# Patient Record
Sex: FEMALE | Race: WHITE | NOT HISPANIC OR LATINO | Employment: FULL TIME | URBAN - NONMETROPOLITAN AREA
[De-identification: names, ages, dates, MRNs, and addresses within clinical notes are randomized per-mention and may not be internally consistent; named-entity substitution may affect disease eponyms.]

---

## 2024-04-25 ENCOUNTER — OFFICE VISIT (OUTPATIENT)
Dept: SURGERY | Facility: CLINIC | Age: 64
End: 2024-04-25
Payer: COMMERCIAL

## 2024-04-25 VITALS
HEART RATE: 72 BPM | HEIGHT: 66 IN | WEIGHT: 162 LBS | OXYGEN SATURATION: 97 % | SYSTOLIC BLOOD PRESSURE: 119 MMHG | BODY MASS INDEX: 26.03 KG/M2 | DIASTOLIC BLOOD PRESSURE: 79 MMHG

## 2024-04-25 DIAGNOSIS — R14.0 ABDOMINAL BLOATING: ICD-10-CM

## 2024-04-25 DIAGNOSIS — K90.49 FOOD INTOLERANCE: ICD-10-CM

## 2024-04-25 DIAGNOSIS — R10.13 EPIGASTRIC PAIN: ICD-10-CM

## 2024-04-25 DIAGNOSIS — K82.4 GALLBLADDER POLYP: Primary | ICD-10-CM

## 2024-04-25 DIAGNOSIS — K29.50 CHRONIC GASTRITIS WITHOUT BLEEDING, UNSPECIFIED GASTRITIS TYPE: ICD-10-CM

## 2024-04-25 RX ORDER — IBUPROFEN 200 MG
200 TABLET ORAL EVERY 6 HOURS PRN
COMMUNITY
End: 2024-05-06

## 2024-04-25 RX ORDER — FAMOTIDINE 20 MG/1
20 TABLET, FILM COATED ORAL DAILY
Qty: 30 TABLET | Refills: 0 | Status: SHIPPED | OUTPATIENT
Start: 2024-04-25 | End: 2024-05-06

## 2024-04-25 RX ORDER — FAMOTIDINE 40 MG/1
40 TABLET, FILM COATED ORAL EVERY EVENING
Qty: 30 TABLET | Refills: 1 | Status: CANCELLED | OUTPATIENT
Start: 2024-04-25 | End: 2025-04-25

## 2024-04-25 RX ORDER — CHLORAL HYDRATE 500 MG
CAPSULE ORAL
COMMUNITY

## 2024-04-25 RX ORDER — DIPHENOXYLATE HYDROCHLORIDE AND ATROPINE SULFATE 2.5; .025 MG/1; MG/1
TABLET ORAL DAILY
COMMUNITY

## 2024-05-03 ENCOUNTER — TELEPHONE (OUTPATIENT)
Dept: NEUROSURGERY | Facility: CLINIC | Age: 64
End: 2024-05-03
Payer: COMMERCIAL

## 2024-05-03 NOTE — TELEPHONE ENCOUNTER
SPOKE TO PT TO CONFIRM APPT PT STATED SHE DOES NOT HAVE IMAGING DISC TOLD HER THAT SHE WILL NEED TO GET IT BEFORE MONDAY. IF NOT WE WILL NOT BE ABLE TO SEE HER TO HER SHE COULD COME IN AT 11. IF SHE HAS HER DISC.     It is okay for the hub to relay the message to the patient and schedule if possible

## 2024-05-06 ENCOUNTER — HOSPITAL ENCOUNTER (OUTPATIENT)
Dept: GENERAL RADIOLOGY | Facility: HOSPITAL | Age: 64
Discharge: HOME | End: 2024-05-06
Payer: COMMERCIAL

## 2024-05-06 ENCOUNTER — APPOINTMENT (OUTPATIENT)
Dept: OTHER | Facility: HOSPITAL | Age: 64
End: 2024-05-06
Payer: COMMERCIAL

## 2024-05-06 ENCOUNTER — OFFICE VISIT (OUTPATIENT)
Dept: NEUROSURGERY | Facility: CLINIC | Age: 64
End: 2024-05-06
Payer: COMMERCIAL

## 2024-05-06 VITALS — HEIGHT: 66 IN | BODY MASS INDEX: 26.16 KG/M2 | WEIGHT: 162.8 LBS

## 2024-05-06 DIAGNOSIS — M54.16 LUMBAR RADICULOPATHY: ICD-10-CM

## 2024-05-06 DIAGNOSIS — Z78.9 NONSMOKER: ICD-10-CM

## 2024-05-06 DIAGNOSIS — M54.2 CERVICALGIA: Primary | ICD-10-CM

## 2024-05-06 DIAGNOSIS — M43.16 SPONDYLOLISTHESIS OF LUMBAR REGION: ICD-10-CM

## 2024-05-06 DIAGNOSIS — E66.3 OVERWEIGHT WITH BODY MASS INDEX (BMI) OF 26 TO 26.9 IN ADULT: ICD-10-CM

## 2024-05-06 DIAGNOSIS — M48.061 SPINAL STENOSIS, LUMBAR REGION, WITHOUT NEUROGENIC CLAUDICATION: ICD-10-CM

## 2024-05-06 DIAGNOSIS — M54.2 CERVICALGIA: ICD-10-CM

## 2024-05-06 PROCEDURE — 72050 X-RAY EXAM NECK SPINE 4/5VWS: CPT

## 2024-05-06 PROCEDURE — 99204 OFFICE O/P NEW MOD 45 MIN: CPT | Performed by: PHYSICIAN ASSISTANT

## 2024-05-06 RX ORDER — METHOCARBAMOL 500 MG/1
TABLET, FILM COATED ORAL
Qty: 30 TABLET | Refills: 0 | Status: SHIPPED | OUTPATIENT
Start: 2024-05-06

## 2024-05-06 RX ORDER — ASPIRIN 81 MG/1
1 TABLET ORAL DAILY
COMMUNITY

## 2024-05-06 RX ORDER — IBUPROFEN 600 MG/1
TABLET ORAL
COMMUNITY
Start: 2024-01-08

## 2024-05-06 RX ORDER — FAMOTIDINE 40 MG/1
1 TABLET, FILM COATED ORAL DAILY
COMMUNITY

## 2024-05-06 RX ORDER — METHYLPREDNISOLONE 4 MG/1
TABLET ORAL
Qty: 21 TABLET | Refills: 1 | Status: SHIPPED | OUTPATIENT
Start: 2024-05-06

## 2024-05-06 RX ORDER — ALBUTEROL SULFATE 90 UG/1
AEROSOL, METERED RESPIRATORY (INHALATION)
COMMUNITY

## 2024-05-06 RX ORDER — FLUTICASONE PROPIONATE 50 MCG
SPRAY, SUSPENSION (ML) NASAL
COMMUNITY

## 2024-06-18 ENCOUNTER — OFFICE VISIT (OUTPATIENT)
Dept: GASTROENTEROLOGY | Facility: CLINIC | Age: 64
End: 2024-06-18
Payer: COMMERCIAL

## 2024-06-18 ENCOUNTER — TELEPHONE (OUTPATIENT)
Dept: GASTROENTEROLOGY | Facility: CLINIC | Age: 64
End: 2024-06-18

## 2024-06-18 VITALS
OXYGEN SATURATION: 97 % | WEIGHT: 160.4 LBS | DIASTOLIC BLOOD PRESSURE: 80 MMHG | HEART RATE: 68 BPM | TEMPERATURE: 95.3 F | HEIGHT: 66 IN | BODY MASS INDEX: 25.78 KG/M2 | SYSTOLIC BLOOD PRESSURE: 120 MMHG

## 2024-06-18 DIAGNOSIS — R10.13 EPIGASTRIC PAIN: Primary | ICD-10-CM

## 2024-06-18 DIAGNOSIS — I48.91 ATRIAL FIBRILLATION, UNSPECIFIED TYPE: ICD-10-CM

## 2024-06-18 DIAGNOSIS — Z78.9 NONSMOKER: ICD-10-CM

## 2024-06-18 DIAGNOSIS — I49.9 IRREGULAR HEARTBEAT: ICD-10-CM

## 2024-06-18 PROCEDURE — 99204 OFFICE O/P NEW MOD 45 MIN: CPT | Performed by: CLINICAL NURSE SPECIALIST

## 2024-06-18 RX ORDER — SENNOSIDES 8.6 MG
650 CAPSULE ORAL EVERY 8 HOURS PRN
COMMUNITY

## 2024-06-25 ENCOUNTER — HOSPITAL ENCOUNTER (OUTPATIENT)
Dept: GENERAL RADIOLOGY | Facility: HOSPITAL | Age: 64
Discharge: HOME | End: 2024-06-25
Admitting: CLINICAL NURSE SPECIALIST
Payer: COMMERCIAL

## 2024-06-25 DIAGNOSIS — R10.13 EPIGASTRIC PAIN: ICD-10-CM

## 2024-06-25 PROCEDURE — 74246 X-RAY XM UPR GI TRC 2CNTRST: CPT

## 2024-06-25 RX ADMIN — BARIUM SULFATE 120 ML: 980 POWDER, FOR SUSPENSION ORAL at 10:34

## 2024-06-25 RX ADMIN — ANTACID/ANTIFLATULENT 1 PACKET: 380; 550; 10; 10 GRANULE, EFFERVESCENT ORAL at 10:34

## 2024-07-02 ENCOUNTER — OFFICE VISIT (OUTPATIENT)
Dept: NEUROSURGERY | Facility: CLINIC | Age: 64
End: 2024-07-02
Payer: COMMERCIAL

## 2024-07-02 VITALS — HEIGHT: 66 IN | BODY MASS INDEX: 25.36 KG/M2 | WEIGHT: 157.8 LBS

## 2024-07-02 DIAGNOSIS — Z78.9 NONSMOKER: ICD-10-CM

## 2024-07-02 DIAGNOSIS — M54.12 CERVICAL RADICULOPATHY: ICD-10-CM

## 2024-07-02 DIAGNOSIS — E66.3 OVERWEIGHT WITH BODY MASS INDEX (BMI) OF 25 TO 25.9 IN ADULT: ICD-10-CM

## 2024-07-02 DIAGNOSIS — M50.30 DDD (DEGENERATIVE DISC DISEASE), CERVICAL: Primary | ICD-10-CM

## 2024-07-02 NOTE — PROGRESS NOTES
"    Chief complaint:   Chief Complaint   Patient presents with    Follow-up     Pt reports to office for follow up after PT- pt states doing good and doing home exercises.         Subjective     HPI: Breana comes in for recheck. She completed cervical physical therapy and reports her symptoms are improved although not resolved.  She continues with left-sided neck pain with radiation into the parascapular region.  Pain increases with activity and certain cervical positions, particularly when her neck is in flexion.  She denies any radiating pain to the upper extremities as well as focal weakness and paresthesias.  Last visit, I gave her steroids which caused her blood pressure to elevate and gave her palpitations.  She is currently utilizing ibuprofen as needed.  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  She previously had been doing this in the office in a standing position and felt that it eased her neck symptoms.    Her lumbar issues are stable.  She denies neurogenic claudication symptoms.  She does not have any radiating pain to the lower extremities and denies focal weakness and paresthesias, specifically any foot drop.    Review of Systems      Objective      Vital Signs  Ht 167.6 cm (65.98\")   Wt 71.6 kg (157 lb 12.8 oz)   BMI 25.48 kg/m²     Physical Exam  Constitutional:       Appearance: Normal appearance. She is well-developed.   HENT:      Head: Normocephalic.   Eyes:      Pupils: Pupils are equal, round, and reactive to light.   Cardiovascular:      Rate and Rhythm: Normal rate.   Pulmonary:      Effort: Pulmonary effort is normal.   Musculoskeletal:         General: Normal range of motion.      Cervical back: Tenderness present.   Skin:     General: Skin is warm and dry.   Neurological:      Mental Status: She is alert and oriented to person, place, and time.      GCS: GCS eye subscore is 4. GCS verbal subscore " is 5. GCS motor subscore is 6.      Cranial Nerves: No cranial nerve deficit.      Sensory: No sensory deficit.      Motor: Motor strength is normal.No weakness.      Coordination: Coordination normal.      Gait: Gait is intact. Gait normal.      Deep Tendon Reflexes: Reflexes are normal and symmetric. Reflexes normal.      Reflex Scores:       Tricep reflexes are 2+ on the right side and 2+ on the left side.       Bicep reflexes are 2+ on the right side and 2+ on the left side.       Brachioradialis reflexes are 2+ on the right side and 2+ on the left side.       Patellar reflexes are 2+ on the right side and 2+ on the left side.       Achilles reflexes are 2+ on the right side and 2+ on the left side.  Psychiatric:         Speech: Speech normal.         Behavior: Behavior normal.         Thought Content: Thought content normal.         Neurologic Exam     Mental Status   Oriented to person, place, and time.   Speech: speech is normal   Level of consciousness: alert  Knowledge: good.     Cranial Nerves     CN III, IV, VI   Pupils are equal, round, and reactive to light.    Motor Exam   Muscle bulk: normal  Overall muscle tone: normal    Strength   Strength 5/5 throughout.     Sensory Exam   Light touch normal.     Gait, Coordination, and Reflexes     Gait  Gait: normal    Reflexes   Right brachioradialis: 2+  Left brachioradialis: 2+  Right biceps: 2+  Left biceps: 2+  Right triceps: 2+  Left triceps: 2+  Right patellar: 2+  Left patellar: 2+  Right achilles: 2+  Left achilles: 2+  Right : 2+  Left : 2+  Right Demarco: absent  Left Demarco: absent      Imaging review: Previous cervical films were reviewed and demonstrate subtle anterior listhesis C3 over 4.  There is severe disc degeneration C5-6 and C6-7.    MRI report of the cervical spine from 2019 was reviewed and describes moderate central stenosis C5-6 with severe bilateral neuroforaminal stenosis.  He also mentions cord syrinx located at the C6  level.        Assessment/Plan: I reviewed images in detail with Breana.  She has severe disc degeneration C5-7 and subtle anterior listhesis C3-4.  She still has some left-sided neck pain with radiation into the left shoulder blade.  She is neurologically stable.  Unfortunately, physical therapy failed to provide symptom resolution.  I therefore would like to proceed with an MRI of the cervical spine to see if there is any significant stenosis that needs addressed surgically.    Currently, her lumbar symptoms are stable.  She does not have any neurogenic claudication symptoms or radicular pain.  We discussed symptoms to report promptly like weakness, specifically foot drop.  She verbalizes understanding.    Recommend she continue her home exercises as current.  We discussed avoidance of aggravating activities as well as protective body mechanics.    She will follow-up after the cervical MRI is completed with Dr. Montalvo.  She will call for sooner appointment if she has any worsening pain, focal weakness or other issues or concerns.    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    Patient is a nonsmoker    The patient's Body mass index is 25.48 kg/m².. BMI is above normal parameters. Recommendations include: educational material    Diagnoses and all orders for this visit:    1. DDD (degenerative disc disease), cervical (Primary)  -     MRI Cervical Spine Without Contrast; Future    2. Cervical radiculopathy  -     MRI Cervical Spine Without Contrast; Future    3. Nonsmoker    4. Overweight with body mass index (BMI) of 25 to 25.9 in adult    I spent 38 minutes caring for Breana on this date of service. This time includes time spent by me in the following activities: preparing for the visit, reviewing tests, obtaining and/or reviewing a separately obtained history, performing a medically appropriate examination and/or evaluation, counseling  and educating the patient/family/caregiver, referring and communicating with other health care professionals, documenting information in the medical record, independently interpreting results and communicating that information with the patient/family/caregiver, and care coordination.      I discussed the patients findings and my recommendations with patient  Nasima Neal PA-C  07/02/24  11:48 CDT

## 2024-07-02 NOTE — LETTER
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   Thank you for your accomodation in this matter.        Sincerely,        Nasima Neal PA-C    CC:   No Recipients

## 2024-07-11 DIAGNOSIS — R10.13 EPIGASTRIC PAIN: Primary | ICD-10-CM

## 2024-07-11 RX ORDER — FAMOTIDINE 40 MG/1
40 TABLET, FILM COATED ORAL DAILY
Qty: 30 TABLET | Refills: 6 | Status: SHIPPED | OUTPATIENT
Start: 2024-07-11

## 2024-07-11 NOTE — TELEPHONE ENCOUNTER
Rx Refill Note  Requested Prescriptions      No prescriptions requested or ordered in this encounter      Last office visit with prescribing clinician: 6/18/2024     Last telemedicine visit with prescribing clinician: Visit date not found     Next office visit with prescribing clinician: 9/4/2024 for endo with Dr. Brizuela.     Pt was just seen in office. She called me just now stating she needs refills on her Famotidine 40mg daily. I have pended refills to [facility]. Pt advised to call me back with any further questions/problems.                          Would you like a call back once the refill request has been completed: [] Yes [] No    If the office needs to give you a call back, can they leave a voicemail: [] Yes [] No    Zoe Baez MA  07/11/24, 13:56 CDT

## 2024-07-31 ENCOUNTER — HOSPITAL ENCOUNTER (OUTPATIENT)
Dept: MRI IMAGING | Facility: HOSPITAL | Age: 64
Discharge: HOME | End: 2024-07-31
Admitting: PHYSICIAN ASSISTANT
Payer: COMMERCIAL

## 2024-07-31 DIAGNOSIS — M50.30 DDD (DEGENERATIVE DISC DISEASE), CERVICAL: ICD-10-CM

## 2024-07-31 DIAGNOSIS — M54.12 CERVICAL RADICULOPATHY: ICD-10-CM

## 2024-07-31 PROCEDURE — 72141 MRI NECK SPINE W/O DYE: CPT

## 2024-09-04 ENCOUNTER — ANESTHESIA EVENT (OUTPATIENT)
Dept: GASTROENTEROLOGY | Facility: HOSPITAL | Age: 64
End: 2024-09-04
Payer: COMMERCIAL

## 2024-09-04 ENCOUNTER — HOSPITAL ENCOUNTER (OUTPATIENT)
Facility: HOSPITAL | Age: 64
Setting detail: HOSPITAL OUTPATIENT SURGERY
Discharge: HOME | End: 2024-09-04
Attending: INTERNAL MEDICINE | Admitting: INTERNAL MEDICINE
Payer: COMMERCIAL

## 2024-09-04 ENCOUNTER — ANESTHESIA (OUTPATIENT)
Dept: GASTROENTEROLOGY | Facility: HOSPITAL | Age: 64
End: 2024-09-04
Payer: COMMERCIAL

## 2024-09-04 VITALS
BODY MASS INDEX: 23.63 KG/M2 | WEIGHT: 147 LBS | SYSTOLIC BLOOD PRESSURE: 127 MMHG | RESPIRATION RATE: 9 BRPM | OXYGEN SATURATION: 100 % | HEART RATE: 70 BPM | DIASTOLIC BLOOD PRESSURE: 79 MMHG | TEMPERATURE: 96.8 F | HEIGHT: 66 IN

## 2024-09-04 DIAGNOSIS — R10.13 EPIGASTRIC PAIN: ICD-10-CM

## 2024-09-04 PROBLEM — K29.70 HELICOBACTER PYLORI GASTRITIS: Status: ACTIVE | Noted: 2024-09-04

## 2024-09-04 PROBLEM — B96.81 HELICOBACTER PYLORI GASTRITIS: Status: ACTIVE | Noted: 2024-09-04

## 2024-09-04 PROCEDURE — 25010000002 PROPOFOL 10 MG/ML EMULSION: Performed by: NURSE ANESTHETIST, CERTIFIED REGISTERED

## 2024-09-04 PROCEDURE — 43239 EGD BIOPSY SINGLE/MULTIPLE: CPT | Performed by: INTERNAL MEDICINE

## 2024-09-04 PROCEDURE — 87081 CULTURE SCREEN ONLY: CPT | Performed by: INTERNAL MEDICINE

## 2024-09-04 PROCEDURE — 25810000003 SODIUM CHLORIDE 0.9 % SOLUTION: Performed by: NURSE ANESTHETIST, CERTIFIED REGISTERED

## 2024-09-04 PROCEDURE — 88305 TISSUE EXAM BY PATHOLOGIST: CPT | Performed by: INTERNAL MEDICINE

## 2024-09-04 RX ORDER — SODIUM CHLORIDE 9 MG/ML
500 INJECTION, SOLUTION INTRAVENOUS CONTINUOUS PRN
Status: DISCONTINUED | OUTPATIENT
Start: 2024-09-04 | End: 2024-09-04 | Stop reason: HOSPADM

## 2024-09-04 RX ORDER — LIDOCAINE HYDROCHLORIDE 10 MG/ML
0.5 INJECTION, SOLUTION EPIDURAL; INFILTRATION; INTRACAUDAL; PERINEURAL ONCE AS NEEDED
Status: DISCONTINUED | OUTPATIENT
Start: 2024-09-04 | End: 2024-09-04 | Stop reason: HOSPADM

## 2024-09-04 RX ORDER — PROPOFOL 10 MG/ML
VIAL (ML) INTRAVENOUS AS NEEDED
Status: DISCONTINUED | OUTPATIENT
Start: 2024-09-04 | End: 2024-09-04 | Stop reason: SURG

## 2024-09-04 RX ORDER — PANTOPRAZOLE SODIUM 40 MG/1
40 TABLET, DELAYED RELEASE ORAL DAILY
Qty: 30 TABLET | Refills: 11 | Status: SHIPPED | OUTPATIENT
Start: 2024-09-04

## 2024-09-04 RX ORDER — LIDOCAINE HYDROCHLORIDE 20 MG/ML
INJECTION, SOLUTION EPIDURAL; INFILTRATION; INTRACAUDAL; PERINEURAL AS NEEDED
Status: DISCONTINUED | OUTPATIENT
Start: 2024-09-04 | End: 2024-09-04 | Stop reason: SURG

## 2024-09-04 RX ORDER — SODIUM CHLORIDE 0.9 % (FLUSH) 0.9 %
10 SYRINGE (ML) INJECTION AS NEEDED
Status: DISCONTINUED | OUTPATIENT
Start: 2024-09-04 | End: 2024-09-04 | Stop reason: HOSPADM

## 2024-09-04 RX ADMIN — SODIUM CHLORIDE 500 ML: 9 INJECTION, SOLUTION INTRAVENOUS at 07:23

## 2024-09-04 RX ADMIN — LIDOCAINE HYDROCHLORIDE 150 MG: 20 INJECTION, SOLUTION EPIDURAL; INFILTRATION; INTRACAUDAL; PERINEURAL at 07:39

## 2024-09-04 RX ADMIN — PROPOFOL 100 MG: 10 INJECTION, EMULSION INTRAVENOUS at 07:39

## 2024-09-04 NOTE — H&P
Chief Complaint:   Midepigastric pain    Subjective     HPI:   Patient is having complaints of midepigastric pain associated with bloating.  She had an endoscopy at outside hospital in [place] and was told that she needs intermittent endoscopies.  Results not available for review.    Past Medical History:   Past Medical History:   Diagnosis Date    A-fib     Arthritis     Diverticulosis     Heart trouble     Herniated disc, cervical     Herniated intervertebral disc of lumbar spine     History of colon polyps     Kidney disorder     3rd kidney on left side. works seperately    PONV (postoperative nausea and vomiting)        Past Surgical History:  Past Surgical History:   Procedure Laterality Date     SECTION      x3    COLONOSCOPY  2020    Dr. Rojas-Grade 2 internal hemorrhoids; Diverticulosis; One 3mm hyperplastic polyp in distal sigmoid; One 4mm polyp in distal transverse-path shows chronic nonspecific colitis; One 4mm hyperplastic polyp in proximal transverse; One 4mm polyp in ascending-path shows chronic colitis with reactive lymphoid aggregate; Erythema and occasional erosions in terminal ileum-biopsied; Repeat 4-5 years    COLONOSCOPY  2015    Dr. Rojas-One 3mm hyperplastic polyp in the rectum; Patchy erythamatous patches in rectum-questionable enema effects-biopsied    COLONOSCOPY  2014    Dr. Rojas-Grade 2 internal hemorrhoids; One 5mm polyp in the mid-transverse colon-path shows colonic mucosa with focal glandular hyperplasia; One 6mm polyp in the cecum-path shows colonic mucosa with focal glandular hyperplasia; Friability and erythema in the rectum; Friability and granularity in the terminal ileum; Normal mucosa in the ascending and descending colon-biopsied; Repeat 5 years    ENDOSCOPY  10/28/2011    duodenitis, mild intraepithelial lymphocytosis, mod/sev chronic gastritis with crushed myphoid aggregates, H pylori, GERD    TRIGGER FINGER RELEASE      x2 thumb     "TUBAL ABDOMINAL LIGATION         Family History:  Family History   Problem Relation Age of Onset    Heart disease Mother     Hypertension Mother     Diabetes Mother     Cancer Father     Diabetes Father     Diabetes Sister     Diabetes Brother     Colon cancer Neg Hx     Colon polyps Neg Hx        Social History:   reports that she has never smoked. She has never been exposed to tobacco smoke. She has never used smokeless tobacco. She reports that she does not drink alcohol and does not use drugs.    Medications:   Medications Prior to Admission   Medication Sig Dispense Refill Last Dose    acetaminophen (Tylenol 8 Hour) 650 MG 8 hr tablet Take 1 tablet by mouth Every 8 (Eight) Hours As Needed.   Past Month    famotidine (PEPCID) 40 MG tablet Take 1 tablet by mouth Daily. 30 tablet 6 9/3/2024    Digestive Enzymes (SUPER PAPAYA PLUS PO) Take  by mouth.   More than a month    multivitamin (THERAGRAN) tablet tablet Take  by mouth Daily.   9/2/2024    NATTOKINASE PO Take 1 capsule by mouth twice a day as directed   9/1/2024    Omega-3 Fatty Acids (Omega-3 Fish Oil) 1000 MG capsule Take  by mouth.   9/2/2024       Allergies:  Morphine    ROS:    Resp: No SOA  Cardiovascular: No CP      Objective     /71 (BP Location: Right arm, Patient Position: Sitting)   Pulse 58   Temp 96.8 °F (36 °C) (Temporal)   Resp 18   Ht 167.6 cm (66\")   Wt 66.7 kg (147 lb)   SpO2 99%   BMI 23.73 kg/m²     Physical Exam   Constitutional: Pt is oriented to person, place, and in no distress.   Pulmonary/Chest: No distress.  No audible wheezes  Psychiatric: Mood, memory, affect and judgment appear normal.     Assessment & Plan     Diagnosis:  Midepigastric pain  Bloating    Anticipated Surgical Procedure:  Endoscopy    The risks, benefits, and alternatives of endoscopy were reviewed with the patient today.  Risks including perforation, with or without dilation, possibly requiring surgery.  Additional risks include risk of bleeding.  " There is also the risk of a drug reaction or problems with anesthesia.  This will be discussed with the further by the anesthesia team on the day of the procedure. The benefits include the diagnosis and management of disease of the upper digestive tract.  Alternatives to endoscopy include upper GI series, laboratory testing, radiographic evaluation, or no intervention.  The patient verbalizes understanding and agrees.    Please note that portions of this note were completed with a voice recognition program.

## 2024-09-04 NOTE — ANESTHESIA POSTPROCEDURE EVALUATION
"Patient: Breana Poon    Procedure Summary       Date: 09/04/24 Room / Location: [facility] ENDOSCOPY 5 / [facility] END[facility]    Anesthesia Start: 0735 Anesthesia Stop: 0750    Procedure: ESOPHAGOGASTRODUODENOSCOPY WITH ANESTHESIA Diagnosis:       Epigastric pain      (Epigastric pain [R10.13])    Surgeons: Angela Brizuela MD Provider: Celio Dorsey CRNA    Anesthesia Type: MAC ASA Status: 2            Anesthesia Type: MAC    Vitals  Vitals Value Taken Time   /76 09/04/24 0748   Temp     Pulse 68 09/04/24 0750   Resp 15 09/04/24 0748   SpO2 96 % 09/04/24 0750   Vitals shown include unfiled device data.        Post Anesthesia Care and Evaluation    Patient location during evaluation: PACU  Patient participation: complete - patient participated  Level of consciousness: awake and alert  Pain management: adequate    Airway patency: patent  Anesthetic complications: No anesthetic complications    Cardiovascular status: acceptable  Respiratory status: acceptable  Hydration status: acceptable    Comments: Blood pressure 122/76, pulse 67, temperature 96.8 °F (36 °C), temperature source Temporal, resp. rate 15, height 167.6 cm (66\"), weight 66.7 kg (147 lb), SpO2 96%, not currently breastfeeding.    Pt discharged from PACU based on zelalem score >8    "

## 2024-09-04 NOTE — ANESTHESIA PREPROCEDURE EVALUATION
Anesthesia Evaluation     history of anesthetic complications:  PONV  NPO Solid Status: > 8 hours  NPO Liquid Status: > 8 hours           Airway   Mallampati: I  No difficulty expected  Dental      Pulmonary    (-) sleep apnea, not a smoker  Cardiovascular   Exercise tolerance: good (4-7 METS)    (+) dysrhythmias Atrial Fib  (-) pacemaker, CAD      Neuro/Psych  (-) seizures, TIA, CVA  GI/Hepatic/Renal/Endo    (+) GERD, renal disease (accessory kidney)-, diabetes mellitus (borderline)  (-) liver disease    Musculoskeletal     Abdominal    Substance History      OB/GYN          Other   arthritis,                   Anesthesia Plan    ASA 2     MAC     intravenous induction     Anesthetic plan, risks, benefits, and alternatives have been provided, discussed and informed consent has been obtained with: patient.    CODE STATUS:

## 2024-09-05 LAB
CYTO UR: NORMAL
LAB AP CASE REPORT: NORMAL
Lab: NORMAL
PATH REPORT.FINAL DX SPEC: NORMAL
PATH REPORT.GROSS SPEC: NORMAL
UREASE TISS QL: NEGATIVE

## 2024-09-16 ENCOUNTER — OFFICE VISIT (OUTPATIENT)
Dept: NEUROSURGERY | Facility: CLINIC | Age: 64
End: 2024-09-16
Payer: COMMERCIAL

## 2024-09-16 VITALS — BODY MASS INDEX: 24.75 KG/M2 | WEIGHT: 154 LBS | HEIGHT: 66 IN

## 2024-09-16 DIAGNOSIS — M54.2 CERVICALGIA: ICD-10-CM

## 2024-09-16 DIAGNOSIS — M54.12 CERVICAL RADICULOPATHY: ICD-10-CM

## 2024-09-16 DIAGNOSIS — M50.30 DDD (DEGENERATIVE DISC DISEASE), CERVICAL: Primary | ICD-10-CM

## 2024-09-16 DIAGNOSIS — Z78.9 NONSMOKER: ICD-10-CM

## 2024-09-16 PROCEDURE — 99213 OFFICE O/P EST LOW 20 MIN: CPT | Performed by: NEUROLOGICAL SURGERY

## 2025-03-03 ENCOUNTER — TELEPHONE (OUTPATIENT)
Dept: NEUROSURGERY | Facility: CLINIC | Age: 65
End: 2025-03-03
Payer: COMMERCIAL

## 2025-04-21 PROBLEM — Z86.0100 HX OF COLONIC POLYPS: Status: ACTIVE | Noted: 2025-04-21

## 2025-04-21 NOTE — PROGRESS NOTES
"Primary Physician: Jodie Malik APRN    Chief Complaint   Patient presents with    Colon Cancer Screening     Pt presents today for colon recall-last colon was 3/2/2020 by Dr. Rojas; Personal history of hyperplastic polyps       Subjective     Breana Poon is a 65 y.o. female.    HPI  Hx Colon Polyps  3/3/2020 Colonoscopy in [facility]: Sigmoid hyperplastic polyp, diverticulosis.  Hyperplastic polyp removed in .    No constipation, abdominal pain or rectal bleeding.  Pt reports she has been having watery stools with urgency that can occur in early morning or mid day while at work.  She will have diarrhea about 4 times per day. No blood in her stools.    No family hx colon cancer.    GERD  She has belching at times. Denies lactose intake.  She has mid epigastric pain.  2024 Endoscopy: small HH, widely patent schatzki ring at [facility] [place].  She is taking Protonix daily.  At times she feels like liquids may go down windpipe instead of esophagus. I did discuss with her setting up an esophagram to evaluate her swallowing and esophagus muscles but she declines that for now as it is \"very Rare\" when it does happen.  She denies any caffeine.  She feels like her stomach triggers her heart to go into AFib.      Past Medical History:   Diagnosis Date    A-fib     Arthritis     Diverticulosis     Heart trouble     Herniated disc, cervical     Herniated intervertebral disc of lumbar spine     History of colon polyps     Kidney disorder     3rd kidney on left side. works seperately    PONV (postoperative nausea and vomiting)        Past Surgical History:   Procedure Laterality Date     SECTION      x3    COLONOSCOPY  2020    Dr. Rojas-Grade 2 internal hemorrhoids; Diverticulosis; One 3mm hyperplastic polyp in distal sigmoid; One 4mm polyp in distal transverse-path shows chronic nonspecific colitis; One 4mm hyperplastic polyp in proximal transverse; One 4mm polyp in ascending-path shows chronic " colitis with reactive lymphoid aggregate; Erythema and occasional erosions in terminal ileum-biopsied; Repeat 4-5 years    COLONOSCOPY  09/28/2015    Dr. Rojas-One 3mm hyperplastic polyp in the rectum; Patchy erythamatous patches in rectum-questionable enema effects-biopsied    COLONOSCOPY  02/21/2014    Dr. Rojas-Grade 2 internal hemorrhoids; One 5mm polyp in the mid-transverse colon-path shows colonic mucosa with focal glandular hyperplasia; One 6mm polyp in the cecum-path shows colonic mucosa with focal glandular hyperplasia; Friability and erythema in the rectum; Friability and granularity in the terminal ileum; Normal mucosa in the ascending and descending colon-biopsied; Repeat 5 years    ENDOSCOPY  10/28/2011    duodenitis, mild intraepithelial lymphocytosis, mod/sev chronic gastritis with crushed myphoid aggregates, H pylori, GERD    ENDOSCOPY N/A 09/04/2024    Small HH; Widely patent Schatzki ring; Gastritis, characterized by erythema-biopsied; Normal examined duodenum    TRIGGER FINGER RELEASE      x2 thumb    TUBAL ABDOMINAL LIGATION          Current Outpatient Medications:     acetaminophen (Tylenol 8 Hour) 650 MG 8 hr tablet, Take 1 tablet by mouth As Needed for Moderate Pain., Disp: , Rfl:     cycloSPORINE (RESTASIS) 0.05 % ophthalmic emulsion, Administer 1 drop to both eyes 2 (Two) Times a Day., Disp: , Rfl:     multivitamin (THERAGRAN) tablet tablet, Take 1 tablet by mouth Daily., Disp: , Rfl:     NATTOKINASE PO, Take 33 mg by mouth Every Other Day., Disp: , Rfl:     Omega-3 Fatty Acids (Omega-3 Fish Oil) 1000 MG capsule, Take 1 capsule by mouth Daily., Disp: , Rfl:     pantoprazole (PROTONIX) 40 MG EC tablet, Take 1 tablet by mouth Daily., Disp: 30 tablet, Rfl: 11    famotidine (Pepcid) 40 MG tablet, Take 1 tablet by mouth Daily., Disp: 30 tablet, Rfl: 6    sodium-potassium-magnesium sulfates (Suprep Bowel Prep Kit) 17.5-3.13-1.6 GM/177ML solution oral solution, Take 1 bottle by mouth Every 12  "(Twelve) Hours., Disp: 354 mL, Rfl: 0    Allergies   Allergen Reactions    Morphine Itching     Pt states it makes her skin crawl.        Social History     Socioeconomic History    Marital status:    Tobacco Use    Smoking status: Never     Passive exposure: Never    Smokeless tobacco: Never   Vaping Use    Vaping status: Never Used   Substance and Sexual Activity    Alcohol use: Never    Drug use: Never    Sexual activity: Defer       Family History   Problem Relation Age of Onset    Heart disease Mother     Hypertension Mother     Diabetes Mother     Cancer Father     Diabetes Father     Diabetes Sister     Diabetes Brother     Colon cancer Neg Hx     Colon polyps Neg Hx     Esophageal cancer Neg Hx     Liver cancer Neg Hx     Stomach cancer Neg Hx     Rectal cancer Neg Hx     Liver disease Neg Hx        Review of Systems   Constitutional:  Negative for unexpected weight change.   Respiratory:  Negative for shortness of breath.    Cardiovascular:  Negative for chest pain.   Gastrointestinal:  Positive for abdominal pain.       Objective     /62 (BP Location: Left arm, Patient Position: Sitting, Cuff Size: Adult)   Pulse 65   Temp 97.5 °F (36.4 °C) (Infrared)   Ht 167.6 cm (66\")   Wt 69.4 kg (153 lb)   SpO2 98%   Breastfeeding No   BMI 24.69 kg/m²     Physical Exam  Vitals reviewed.   Constitutional:       Appearance: Normal appearance.   Cardiovascular:      Rate and Rhythm: Normal rate and regular rhythm.      Heart sounds: Normal heart sounds.   Pulmonary:      Effort: Pulmonary effort is normal.      Breath sounds: Normal breath sounds.   Neurological:      Mental Status: She is alert.             Lab Results - Last 18 Months   Lab Units 06/27/24  0458 05/16/24  1712 03/27/24  0859   HEMOGLOBIN g/dL 15.6 15.5 14.1   HEMATOCRIT % 47.7* 46.3 43.9   MCV fL 92.4 91.0 93.0   WBC 10*3/uL 7.9 11.5* 7.1   RDW % 12.9 13.1 12.9   MPV fL 9.9 9.2 9.1   PLATELETS 10*3/uL 244 268 294 "               IMPRESSION/PLAN:    Assessment & Plan      Problem List Items Addressed This Visit       Epigastric pain    Overview   Chronic upper abdominal pain.  No Araujo's or esophagitis 9/2024.  Small hiatal hernia seen.  Mild gastritis.  Urease negative.  4/22/25 taking Protonix once daily.         Current Assessment & Plan   Add Pepcid once daily before supper         Relevant Medications    famotidine (Pepcid) 40 MG tablet    Hx of colonic polyps    Overview   3/3/2020 Colonoscopy: hyperplastic polyp in the sigmoid.  2015 Hyperplastic polyp resected.         Current Assessment & Plan   Plan Colonoscopy per Dr Brizuela         Relevant Orders    Case Request (Completed)    Follow Anesthesia Guidelines / Protocol    Diarrhea - Primary    Overview   Pt reports she has loose stools (watery at times) associated with urgency at least 4 out of 7 days of week         Relevant Medications    sodium-potassium-magnesium sulfates (Suprep Bowel Prep Kit) 17.5-3.13-1.6 GM/177ML solution oral solution    Other Relevant Orders    Case Request (Completed)    Follow Anesthesia Guidelines / Protocol     Colonoscopy per Dr Brizuela  Suprep Prep  Pt instructed to hold Nattokinase 7 days prior to procedure.    Begin Pepcid every evening before supper        ..The risks, benefits, and alternatives of colonoscopy were reviewed with the patient today.  Risks including perforation of the colon possibly requiring surgery or colostomy.  Additional risks include risk of bleeding from biopsies or removal of colon tissue.  There is also the risk of a drug reaction or problems with anesthesia.  This will be discussed with the further by the anesthesia team on the day of the procedure.  Lastly there is a possibility of missing a colon polyp or cancer.  The benefits include the diagnosis and management of disease of the colon and rectum.  Alternatives to colonoscopy include barium enema, laboratory testing, radiographic evaluation, or no  intervention.  The patient verbalizes understanding and agrees.    In accordance with requirements under the Affordable Care Act, [facility] has provided pricing for all hospital services and items on each of its websites. However, a patient's actual cost may differ based on the services the patient receives to meet individual healthcare needs and based on the benefits provided under the patient’s insurance coverage.        Ruchi Alas, APRN  04/22/25  12:08 CDT    Part of this note may be an electronic transcription/translation of spoken language to printed text.

## 2025-04-22 ENCOUNTER — OFFICE VISIT (OUTPATIENT)
Dept: NEUROSURGERY | Facility: CLINIC | Age: 65
End: 2025-04-22
Payer: COMMERCIAL

## 2025-04-22 ENCOUNTER — OFFICE VISIT (OUTPATIENT)
Dept: GASTROENTEROLOGY | Facility: CLINIC | Age: 65
End: 2025-04-22
Payer: COMMERCIAL

## 2025-04-22 VITALS — HEIGHT: 66 IN | BODY MASS INDEX: 25.13 KG/M2 | WEIGHT: 156.4 LBS

## 2025-04-22 VITALS
OXYGEN SATURATION: 98 % | BODY MASS INDEX: 24.59 KG/M2 | HEART RATE: 65 BPM | HEIGHT: 66 IN | WEIGHT: 153 LBS | DIASTOLIC BLOOD PRESSURE: 62 MMHG | TEMPERATURE: 97.5 F | SYSTOLIC BLOOD PRESSURE: 110 MMHG

## 2025-04-22 DIAGNOSIS — R10.13 EPIGASTRIC PAIN: ICD-10-CM

## 2025-04-22 DIAGNOSIS — E66.3 OVERWEIGHT WITH BODY MASS INDEX (BMI) OF 25 TO 25.9 IN ADULT: ICD-10-CM

## 2025-04-22 DIAGNOSIS — Z86.0100 HX OF COLONIC POLYPS: ICD-10-CM

## 2025-04-22 DIAGNOSIS — M50.30 DDD (DEGENERATIVE DISC DISEASE), CERVICAL: ICD-10-CM

## 2025-04-22 DIAGNOSIS — M43.16 SPONDYLOLISTHESIS OF LUMBAR REGION: Primary | ICD-10-CM

## 2025-04-22 DIAGNOSIS — Z78.9 NONSMOKER: ICD-10-CM

## 2025-04-22 DIAGNOSIS — R19.7 DIARRHEA, UNSPECIFIED TYPE: Primary | ICD-10-CM

## 2025-04-22 PROCEDURE — 99214 OFFICE O/P EST MOD 30 MIN: CPT | Performed by: NURSE PRACTITIONER

## 2025-04-22 PROCEDURE — 99214 OFFICE O/P EST MOD 30 MIN: CPT | Performed by: PHYSICIAN ASSISTANT

## 2025-04-22 RX ORDER — SODIUM, POTASSIUM,MAG SULFATES 17.5-3.13G
1 SOLUTION, RECONSTITUTED, ORAL ORAL EVERY 12 HOURS
Qty: 354 ML | Refills: 0 | Status: SHIPPED | OUTPATIENT
Start: 2025-04-22

## 2025-04-22 RX ORDER — FAMOTIDINE 40 MG/1
40 TABLET, FILM COATED ORAL DAILY
Qty: 30 TABLET | Refills: 6 | Status: SHIPPED | OUTPATIENT
Start: 2025-04-22

## 2025-04-22 RX ORDER — CYCLOSPORINE 0.5 MG/ML
1 EMULSION OPHTHALMIC 2 TIMES DAILY
COMMUNITY

## 2025-04-22 NOTE — PATIENT INSTRUCTIONS
Follow up in 4 months  Call for a sooner appointment if you have any worsening pain, focal weakness or other issues/concerns

## 2025-04-22 NOTE — PROGRESS NOTES
"    Chief complaint:   Chief Complaint   Patient presents with    Follow-up     Pt states she is learning to live with the issues states its not bad unless she over does things.         Subjective     HPI: Breana comes in today for recheck.  She continues with some posterior cervical pain but can typically get pain under control when she has a flareup with ibuprofen and a heating pad.  She denies any radiating pain to the upper extremities, focal weakness and paresthesias.  She also continues with intermittent paresthesias around the right anterior ankle and dorsal surface of the right foot.  She can often change position and alleviate symptoms.  She denies any radicular pain and focal weakness.    Review of Systems      Objective      Vital Signs  Ht 167.6 cm (65.98\")   Wt 70.9 kg (156 lb 6.4 oz)   BMI 25.26 kg/m²     Physical Exam  Constitutional:       Appearance: Normal appearance. She is well-developed.   HENT:      Head: Normocephalic.   Eyes:      Pupils: Pupils are equal, round, and reactive to light.   Cardiovascular:      Rate and Rhythm: Normal rate.   Pulmonary:      Effort: Pulmonary effort is normal.   Musculoskeletal:         General: Normal range of motion.      Cervical back: Normal range of motion.   Skin:     General: Skin is warm and dry.   Neurological:      Mental Status: She is oriented to person, place, and time.      GCS: GCS eye subscore is 4. GCS verbal subscore is 5. GCS motor subscore is 6.      Cranial Nerves: No cranial nerve deficit.      Sensory: No sensory deficit.      Motor: Motor strength is normal.No weakness.      Gait: Gait normal.      Deep Tendon Reflexes: Reflexes normal.      Reflex Scores:       Tricep reflexes are 2+ on the right side and 2+ on the left side.       Bicep reflexes are 2+ on the right side and 2+ on the left side.       Brachioradialis reflexes are 2+ on the right side and 2+ on the left side.       Patellar reflexes are 2+ on the right side and 2+ on the " left side.       Achilles reflexes are 2+ on the right side and 2+ on the left side.  Psychiatric:         Speech: Speech normal.         Behavior: Behavior normal.         Thought Content: Thought content normal.         Neurological Exam  Mental Status  Awake, alert and oriented to person, place and time. Oriented to person, place and time. Oriented to person, place, and time. Speech is normal.    Cranial Nerves  CN III, IV, VI: Pupils equal round and reactive to light bilaterally.    Motor  Normal muscle bulk throughout. Strength is 5/5 throughout all four extremities.    Sensory  Sensation is intact to light touch, pinprick, vibration and proprioception in all four extremities.    Reflexes                                            Right                      Left  Brachioradialis                    2+                         2+  Biceps                                 2+                         2+  Triceps                                2+                         2+  Patellar                                2+                         2+  Achilles                                2+                         2+    Right pathological reflexes: Aisha's absent.  Left pathological reflexes: Aisha's absent.    Gait   Normal gait.Casual gait is normal including stance, stride, and arm swing.       Imaging review: MRI of the cervical spine was reviewed and demonstrates modest disc degeneration C5-6 and C6-7.  There is severe bilateral neuroforaminal stenosis at C5-C6 and C6-C7.  Mild central stenosis both levels.    MRI of the lumbar spine was reviewed and demonstrates anterior listhesis L4 over L5 with moderate central stenosis, lateral recess stenosis bilaterally and moderate right neuroforaminal stenosis.    Assessment/Plan: I reviewed images in detail with Breana.  She has disc degeneration C5-6 and C6-7 but currently has no neck pain or radicular complaints.  She also has anterior listhesis L4 over L5 and has some  intermittent paresthesias in the right anterior ankle and foot.  She is usually able to change positions and paresthesias will resolve.  She does not have any focal weakness.  She is currently managing with intermittent NSAIDs and heating pad.  She is neurologically stable.    We discussed avoidance of aggravating activities as well as protective body mechanics.    She declines steroid pack today.  She does not like the side effects.    She will recheck with me in the [place] office in about 4 months.  If she develops any worsening pain, focal weakness or other issues or concerns before that appointment, she will call for sooner visit.      Patient is a nonsmoker    The patient's Body mass index is 25.26 kg/m².. BMI is above normal parameters. Recommendations include: educational material    Diagnoses and all orders for this visit:    1. Spondylolisthesis of lumbar region (Primary)    2. DDD (degenerative disc disease), cervical    3. Nonsmoker    4. Overweight with body mass index (BMI) of 25 to 25.9 in adult    I spent 34 minutes caring for Breana on this date of service. This time includes time spent by me in the following activities: preparing for the visit, reviewing tests, obtaining and/or reviewing a separately obtained history, performing a medically appropriate examination and/or evaluation, counseling and educating the patient/family/caregiver, ordering medications, tests, or procedures, referring and communicating with other health care professionals, documenting information in the medical record, independently interpreting results and communicating that information with the patient/family/caregiver, and care coordination.      I discussed the patients findings and my recommendations with patient  Nasima Neal PA-C  04/22/25  15:00 CDT

## 2025-05-23 ENCOUNTER — ANESTHESIA (OUTPATIENT)
Dept: GASTROENTEROLOGY | Facility: HOSPITAL | Age: 65
End: 2025-05-23
Payer: COMMERCIAL

## 2025-05-23 ENCOUNTER — HOSPITAL ENCOUNTER (OUTPATIENT)
Facility: HOSPITAL | Age: 65
Setting detail: HOSPITAL OUTPATIENT SURGERY
Discharge: HOME | End: 2025-05-23
Attending: INTERNAL MEDICINE | Admitting: INTERNAL MEDICINE
Payer: COMMERCIAL

## 2025-05-23 ENCOUNTER — ANESTHESIA EVENT (OUTPATIENT)
Dept: GASTROENTEROLOGY | Facility: HOSPITAL | Age: 65
End: 2025-05-23
Payer: COMMERCIAL

## 2025-05-23 VITALS
HEART RATE: 55 BPM | DIASTOLIC BLOOD PRESSURE: 77 MMHG | SYSTOLIC BLOOD PRESSURE: 116 MMHG | WEIGHT: 150 LBS | HEIGHT: 66 IN | OXYGEN SATURATION: 100 % | BODY MASS INDEX: 24.11 KG/M2 | RESPIRATION RATE: 18 BRPM | TEMPERATURE: 98 F

## 2025-05-23 DIAGNOSIS — Z86.0100 HX OF COLONIC POLYPS: ICD-10-CM

## 2025-05-23 PROBLEM — Z86.19 HISTORY OF HELICOBACTER PYLORI INFECTION: Status: ACTIVE | Noted: 2024-09-04

## 2025-05-23 PROBLEM — Z12.11 COLON CANCER SCREENING: Status: ACTIVE | Noted: 2025-04-21

## 2025-05-23 PROCEDURE — 25010000002 PROPOFOL 10 MG/ML EMULSION

## 2025-05-23 PROCEDURE — 45380 COLONOSCOPY AND BIOPSY: CPT | Performed by: INTERNAL MEDICINE

## 2025-05-23 PROCEDURE — 25010000002 LIDOCAINE PF 2% 2 % SOLUTION

## 2025-05-23 PROCEDURE — 88305 TISSUE EXAM BY PATHOLOGIST: CPT | Performed by: INTERNAL MEDICINE

## 2025-05-23 PROCEDURE — 25810000003 SODIUM CHLORIDE 0.9 % SOLUTION

## 2025-05-23 PROCEDURE — 25810000003 SODIUM CHLORIDE 0.9 % SOLUTION: Performed by: ANESTHESIOLOGY

## 2025-05-23 RX ORDER — SODIUM CHLORIDE 9 MG/ML
INJECTION, SOLUTION INTRAVENOUS CONTINUOUS PRN
Status: DISCONTINUED | OUTPATIENT
Start: 2025-05-23 | End: 2025-05-23 | Stop reason: SURG

## 2025-05-23 RX ORDER — SODIUM CHLORIDE 9 MG/ML
500 INJECTION, SOLUTION INTRAVENOUS ONCE
Status: COMPLETED | OUTPATIENT
Start: 2025-05-23 | End: 2025-05-23

## 2025-05-23 RX ORDER — LIDOCAINE HYDROCHLORIDE 10 MG/ML
0.5 INJECTION, SOLUTION EPIDURAL; INFILTRATION; INTRACAUDAL; PERINEURAL ONCE AS NEEDED
Status: DISCONTINUED | OUTPATIENT
Start: 2025-05-23 | End: 2025-05-23 | Stop reason: HOSPADM

## 2025-05-23 RX ORDER — LIDOCAINE HYDROCHLORIDE 20 MG/ML
INJECTION, SOLUTION EPIDURAL; INFILTRATION; INTRACAUDAL; PERINEURAL AS NEEDED
Status: DISCONTINUED | OUTPATIENT
Start: 2025-05-23 | End: 2025-05-23 | Stop reason: SURG

## 2025-05-23 RX ORDER — PROPOFOL 10 MG/ML
VIAL (ML) INTRAVENOUS AS NEEDED
Status: DISCONTINUED | OUTPATIENT
Start: 2025-05-23 | End: 2025-05-23 | Stop reason: SURG

## 2025-05-23 RX ORDER — SODIUM CHLORIDE 0.9 % (FLUSH) 0.9 %
10 SYRINGE (ML) INJECTION AS NEEDED
Status: DISCONTINUED | OUTPATIENT
Start: 2025-05-23 | End: 2025-05-23 | Stop reason: HOSPADM

## 2025-05-23 RX ADMIN — SODIUM CHLORIDE 500 ML: 9 INJECTION, SOLUTION INTRAVENOUS at 09:11

## 2025-05-23 RX ADMIN — PROPOFOL 210 MG: 10 INJECTION, EMULSION INTRAVENOUS at 09:18

## 2025-05-23 RX ADMIN — LIDOCAINE HYDROCHLORIDE 50 MG: 20 INJECTION, SOLUTION EPIDURAL; INFILTRATION; INTRACAUDAL; PERINEURAL at 09:18

## 2025-05-23 RX ADMIN — SODIUM CHLORIDE: 9 INJECTION, SOLUTION INTRAVENOUS at 09:16

## 2025-05-23 NOTE — ANESTHESIA PREPROCEDURE EVALUATION
Anesthesia Evaluation     Patient summary reviewed   history of anesthetic complications:  PONV  NPO Solid Status: > 8 hours  NPO Liquid Status: > 8 hours           Airway   Mallampati: I  No difficulty expected  Dental      Pulmonary    (-) sleep apnea, not a smoker  Cardiovascular   Exercise tolerance: good (4-7 METS)    (+) dysrhythmias (paroxysmal) Atrial Fib  (-) pacemaker, CAD      Neuro/Psych  (-) seizures, TIA, CVA  GI/Hepatic/Renal/Endo    (+) GERD, renal disease (accessory kidney)-, diabetes mellitus (borderline)  (-) liver disease    Musculoskeletal     Abdominal    Substance History      OB/GYN          Other   arthritis,                       Anesthesia Plan    ASA 2     MAC     (Pt 'anticoagulated' with nattokinase-held. Discussed )  intravenous induction     Anesthetic plan, risks, benefits, and alternatives have been provided, discussed and informed consent has been obtained with: patient.      CODE STATUS:

## 2025-05-23 NOTE — ANESTHESIA POSTPROCEDURE EVALUATION
Patient: Breana Poon    Procedure Summary       Date: 05/23/25 Room / Location: [facility] ENDOSCOPY 2 / [facility] END[facility]    Anesthesia Start: 0916 Anesthesia Stop: 0938    Procedure: COLONOSCOPY WITH ANESTHESIA Diagnosis:       Hx of colonic polyps      (Hx of colonic polyps [Z86.0100])    Surgeons: Angela Brizuela MD Provider: Wendi Garcia CRNA    Anesthesia Type: MAC ASA Status: 2            Anesthesia Type: MAC    Vitals  Vitals Value Taken Time   /68 05/23/25 09:51   Temp     Pulse 61 05/23/25 09:56   Resp 15 05/23/25 09:50   SpO2 99 % 05/23/25 09:56   Vitals shown include unfiled device data.        Post Anesthesia Care and Evaluation    Patient location during evaluation: bedside  Patient participation: complete - patient participated  Level of consciousness: awake and alert  Pain management: adequate    Airway patency: patent  Anesthetic complications: No anesthetic complications  PONV Status: none  Cardiovascular status: acceptable  Respiratory status: acceptable  Hydration status: acceptable  No anesthesia care post op

## 2025-05-23 NOTE — H&P
Chief Complaint:   Diarrhea    Subjective     HPI:   Patient is having complaints of diarrhea.  Last colonoscopy was done 3/2020 in [place].  No history of adenomas.  Family history is negative.    Past Medical History:   Past Medical History:   Diagnosis Date    A-fib     Arthritis     Diverticulosis     Elevated cholesterol     Heart trouble     Herniated disc, cervical     Herniated intervertebral disc of lumbar spine     History of colon polyps     Kidney disorder     3rd kidney on left side. works seperately    PONV (postoperative nausea and vomiting)        Past Surgical History:  Past Surgical History:   Procedure Laterality Date     SECTION      x3    COLONOSCOPY  2020    Dr. Rojas-Grade 2 internal hemorrhoids; Diverticulosis; One 3mm hyperplastic polyp in distal sigmoid; One 4mm polyp in distal transverse-path shows chronic nonspecific colitis; One 4mm hyperplastic polyp in proximal transverse; One 4mm polyp in ascending-path shows chronic colitis with reactive lymphoid aggregate; Erythema and occasional erosions in terminal ileum-biopsied; Repeat 4-5 years    COLONOSCOPY  2015    Dr. Rojas-One 3mm hyperplastic polyp in the rectum; Patchy erythamatous patches in rectum-questionable enema effects-biopsied    COLONOSCOPY  2014    Dr. Rojas-Grade 2 internal hemorrhoids; One 5mm polyp in the mid-transverse colon-path shows colonic mucosa with focal glandular hyperplasia; One 6mm polyp in the cecum-path shows colonic mucosa with focal glandular hyperplasia; Friability and erythema in the rectum; Friability and granularity in the terminal ileum; Normal mucosa in the ascending and descending colon-biopsied; Repeat 5 years    ENDOSCOPY  10/28/2011    duodenitis, mild intraepithelial lymphocytosis, mod/sev chronic gastritis with crushed myphoid aggregates, H pylori, GERD    ENDOSCOPY N/A 2024    Small HH; Widely patent Schatzki ring; Gastritis, characterized by  "erythema-biopsied; Normal examined duodenum    TRIGGER FINGER RELEASE      x2 thumb    TUBAL ABDOMINAL LIGATION          Family History:  Family History   Problem Relation Age of Onset    Heart disease Mother     Hypertension Mother     Diabetes Mother     Cancer Father     Diabetes Father     Diabetes Sister     Diabetes Brother     Colon cancer Neg Hx     Colon polyps Neg Hx     Esophageal cancer Neg Hx     Liver cancer Neg Hx     Stomach cancer Neg Hx     Rectal cancer Neg Hx     Liver disease Neg Hx        Social History:   reports that she has never smoked. She has never been exposed to tobacco smoke. She has never used smokeless tobacco. She reports that she does not drink alcohol and does not use drugs.    Medications:   Medications Prior to Admission   Medication Sig Dispense Refill Last Dose/Taking    acetaminophen (Tylenol 8 Hour) 650 MG 8 hr tablet Take 1 tablet by mouth As Needed for Moderate Pain.   More than a month    cycloSPORINE (RESTASIS) 0.05 % ophthalmic emulsion Administer 1 drop to both eyes 2 (Two) Times a Day.   Past Week    famotidine (Pepcid) 40 MG tablet Take 1 tablet by mouth Daily. 30 tablet 6 5/21/2025    multivitamin (THERAGRAN) tablet tablet Take 1 tablet by mouth Daily.   5/16/2025    NATTOKINASE PO Take 33 mg by mouth Every Other Day.   5/16/2025    Omega-3 Fatty Acids (Omega-3 Fish Oil) 1000 MG capsule Take 1 capsule by mouth Daily.   5/16/2025    pantoprazole (PROTONIX) 40 MG EC tablet Take 1 tablet by mouth Daily. 30 tablet 11 5/21/2025       Allergies:  Morphine    ROS:    Resp: No SOA  Cardiovascular: No CP      Objective     /75 (Patient Position: Sitting)   Pulse 65   Temp 98 °F (36.7 °C) (Temporal)   Resp 18   Ht 167.6 cm (66\")   Wt 68 kg (150 lb)   SpO2 99%   BMI 24.21 kg/m²     Physical Exam   Constitutional: Patient is oriented to person, place, and in no distress.  Pulmonary/Chest: No distress.  No audible wheezes  Psychiatric: Mood, memory, affect and " judgment appear normal.     Assessment & Plan     Diagnosis:  Diarrhea    Anticipated Surgical Procedure:  Colonoscopy    The risks, benefits, and alternatives of colonoscopy were reviewed with the patient today.  Risks including perforation of the colon possibly requiring surgery or colostomy.  Additional risks include risk of bleeding from biopsies or removal of colon tissue.  There is also the risk of a drug reaction or problems with anesthesia.  This will be discussed with the patient further by the anesthesia team on the day of the procedure.  Lastly there is a possibility of missing a colon polyp or cancer.  The benefits include the diagnosis and management of disease of the colon and rectum.  Alternatives to colonoscopy include barium enema, laboratory testing, radiographic evaluation, or no intervention.  The patient verbalizes understanding and agrees.        Please note that portions of this note were completed with a voice recognition program.

## 2025-05-27 LAB
CYTO UR: NORMAL
LAB AP CASE REPORT: NORMAL
Lab: NORMAL
PATH REPORT.FINAL DX SPEC: NORMAL
PATH REPORT.GROSS SPEC: NORMAL

## 2025-08-25 ENCOUNTER — OFFICE VISIT (OUTPATIENT)
Age: 65
End: 2025-08-25
Payer: COMMERCIAL

## 2025-08-25 VITALS — HEIGHT: 66 IN | WEIGHT: 157.2 LBS | BODY MASS INDEX: 25.26 KG/M2

## 2025-08-25 DIAGNOSIS — M48.02 FORAMINAL STENOSIS OF CERVICAL REGION: ICD-10-CM

## 2025-08-25 DIAGNOSIS — H93.19 TINNITUS, UNSPECIFIED LATERALITY: ICD-10-CM

## 2025-08-25 DIAGNOSIS — Z78.9 NONSMOKER: ICD-10-CM

## 2025-08-25 DIAGNOSIS — E66.3 OVERWEIGHT WITH BODY MASS INDEX (BMI) OF 25 TO 25.9 IN ADULT: ICD-10-CM

## 2025-08-25 DIAGNOSIS — M43.16 SPONDYLOLISTHESIS OF LUMBAR REGION: Primary | ICD-10-CM

## 2025-08-25 PROCEDURE — 99214 OFFICE O/P EST MOD 30 MIN: CPT | Performed by: PHYSICIAN ASSISTANT

## (undated) DEVICE — MASK,OXYGEN,MED CONC,ADLT,7' TUB, UC: Brand: PENDING

## (undated) DEVICE — FRCP BX RADJAW4 NDL 2.8 240 STD OG

## (undated) DEVICE — SENSR O2 OXIMAX FNGR A/ 18IN NONSTR

## (undated) DEVICE — THE CHANNEL CLEANING BRUSH IS A NYLON FLEXI BRUSH ATTACHED TO A FLEXIBLE PLASTIC SHEATH DESIGNED TO SAFELY REMOVE DEBRIS FROM FLEXIBLE ENDOSCOPES.

## (undated) DEVICE — Device: Brand: DEFENDO AIR/WATER/SUCTION AND BIOPSY VALVE

## (undated) DEVICE — YANKAUER,BULB TIP WITH VENT: Brand: ARGYLE

## (undated) DEVICE — ARGYLE YANKAUER BULB TIP WITH VENT: Brand: ARGYLE

## (undated) DEVICE — CUFF,BP,DISP,1 TUBE,ADULT,HP: Brand: MEDLINE

## (undated) DEVICE — DEFENDO AIR WATER SUCTION AND BIOPSY VALVE KIT FOR  OLYMPUS: Brand: DEFENDO AIR/WATER/SUCTION AND BIOPSY VALVE

## (undated) DEVICE — CONMED SCOPE SAVER BITE BLOCK, 20X27 MM: Brand: SCOPE SAVER